# Patient Record
Sex: MALE | Race: BLACK OR AFRICAN AMERICAN
[De-identification: names, ages, dates, MRNs, and addresses within clinical notes are randomized per-mention and may not be internally consistent; named-entity substitution may affect disease eponyms.]

---

## 2017-09-08 ENCOUNTER — HOSPITAL ENCOUNTER (EMERGENCY)
Dept: HOSPITAL 62 - ER | Age: 42
Discharge: HOME | End: 2017-09-08
Payer: COMMERCIAL

## 2017-09-08 VITALS — DIASTOLIC BLOOD PRESSURE: 89 MMHG | SYSTOLIC BLOOD PRESSURE: 133 MMHG

## 2017-09-08 DIAGNOSIS — R51: ICD-10-CM

## 2017-09-08 DIAGNOSIS — I10: Primary | ICD-10-CM

## 2017-09-08 PROCEDURE — 99283 EMERGENCY DEPT VISIT LOW MDM: CPT

## 2017-09-13 NOTE — ER DOCUMENT REPORT
ED General





- General


Chief Complaint: Headache


Stated Complaint: POSSIBLE BLOOD PRESSURE ISSUE


Time Seen by Provider: 09/08/17 12:55


Mode of Arrival: Ambulatory


Information source: Patient


TRAVEL OUTSIDE OF THE [place] IN LAST 30 DAYS: No





- HPI


Patient complains to provider of: BP issue


Onset: This morning - pt. states he checked his BP ealrier today and it was 

"elevated."  Has had mild HA for the past 2 days





- Related Data


Allergies/Adverse Reactions: 


 





No Known Allergies Allergy (Verified 09/08/17 12:46)


 











Past Medical History





- General


Information source: Patient





- Social History


Smoking Status: Never Smoker


Cigarette use (# per day): No


Chew tobacco use (# tins/day): No


Smoking Education Provided: No


Family History: Reviewed & Not Pertinent, Hypertension





- Past Medical History


Cardiac Medical History: Reports: Hx Hypertension


Renal/ Medical History: Denies: Hx Peritoneal Dialysis





- Immunizations


Hx Diphtheria, Pertussis, Tetanus Vaccination: No





Review of Systems





- Review of Systems


Constitutional: No symptoms reported


EENT: No symptoms reported


Cardiovascular: No symptoms reported


Respiratory: No symptoms reported


Gastrointestinal: No symptoms reported


Musculoskeletal: No symptoms reported


-: Yes All other systems reviewed and negative





Physical Exam





- Vital signs


Vitals: 





 











Temp Pulse Resp BP Pulse Ox


 


 98.1 F   77   16   133/89 H  98 


 


 09/08/17 12:46  09/08/17 12:46  09/08/17 12:46  09/08/17 12:46  09/08/17 12:46














- General


General appearance: Appears well


In distress: None





- HEENT


Head: Normocephalic


Eyes: Normal


Pupils: PERRL


Pharynx: Normal


Neck: Normal





- Respiratory


Respiratory status: No respiratory distress


Breath sounds: Normal





- Cardiovascular


Rhythm: Regular


Heart sounds: Normal auscultation





- Extremities


General upper extremity: Normal inspection


General lower extremity: Normal inspection





Course





- Re-evaluation


Re-evalutation: 





09/13/17 10:57


pt felt better at time of d/c -- /89- expressed desire to go home





- Vital Signs


Vital signs: 





 











Temp Pulse Resp BP Pulse Ox


 


 98.1 F   77   16   133/89 H  98 


 


 09/08/17 12:46  09/08/17 12:46  09/08/17 12:46  09/08/17 12:46  09/08/17 12:46














Discharge





- Discharge


Clinical Impression: 


 HTN (hypertension), benign





Condition: Stable


Disposition: HOME, SELF-CARE


Additional Instructions: 


rest, BP recheck in am, return if worse


Referrals: 


BASIL VARGAS DO [NO LOCAL MD] - Follow up as needed

## 2018-06-24 ENCOUNTER — HOSPITAL ENCOUNTER (EMERGENCY)
Dept: HOSPITAL 62 - ER | Age: 43
Discharge: HOME | End: 2018-06-24
Payer: COMMERCIAL

## 2018-06-24 VITALS — SYSTOLIC BLOOD PRESSURE: 131 MMHG | DIASTOLIC BLOOD PRESSURE: 82 MMHG

## 2018-06-24 DIAGNOSIS — R11.2: ICD-10-CM

## 2018-06-24 DIAGNOSIS — R61: ICD-10-CM

## 2018-06-24 DIAGNOSIS — K76.89: ICD-10-CM

## 2018-06-24 DIAGNOSIS — F17.200: ICD-10-CM

## 2018-06-24 DIAGNOSIS — R10.9: ICD-10-CM

## 2018-06-24 DIAGNOSIS — N20.1: ICD-10-CM

## 2018-06-24 DIAGNOSIS — N28.1: Primary | ICD-10-CM

## 2018-06-24 LAB
ADD MANUAL DIFF: NO
ALBUMIN SERPL-MCNC: 4.4 G/DL (ref 3.5–5)
ALP SERPL-CCNC: 62 U/L (ref 38–126)
ALT SERPL-CCNC: 39 U/L (ref 21–72)
ANION GAP SERPL CALC-SCNC: 9 MMOL/L (ref 5–19)
APPEARANCE UR: (no result)
APTT PPP: YELLOW S
AST SERPL-CCNC: 24 U/L (ref 17–59)
BASOPHILS # BLD AUTO: 0 10^3/UL (ref 0–0.2)
BASOPHILS NFR BLD AUTO: 0.4 % (ref 0–2)
BILIRUB DIRECT SERPL-MCNC: 0.4 MG/DL (ref 0–0.4)
BILIRUB SERPL-MCNC: 0.7 MG/DL (ref 0.2–1.3)
BILIRUB UR QL STRIP: NEGATIVE
BUN SERPL-MCNC: 15 MG/DL (ref 7–20)
CALCIUM: 10.3 MG/DL (ref 8.4–10.2)
CHLORIDE SERPL-SCNC: 107 MMOL/L (ref 98–107)
CO2 SERPL-SCNC: 26 MMOL/L (ref 22–30)
EOSINOPHIL # BLD AUTO: 0.1 10^3/UL (ref 0–0.6)
EOSINOPHIL NFR BLD AUTO: 0.6 % (ref 0–6)
ERYTHROCYTE [DISTWIDTH] IN BLOOD BY AUTOMATED COUNT: 13.2 % (ref 11.5–14)
GLUCOSE SERPL-MCNC: 110 MG/DL (ref 75–110)
GLUCOSE UR STRIP-MCNC: NEGATIVE MG/DL
HCT VFR BLD CALC: 48.2 % (ref 37.9–51)
HGB BLD-MCNC: 15.6 G/DL (ref 13.5–17)
KETONES UR STRIP-MCNC: NEGATIVE MG/DL
LIPASE SERPL-CCNC: 45.3 U/L (ref 23–300)
LYMPHOCYTES # BLD AUTO: 1 10^3/UL (ref 0.5–4.7)
LYMPHOCYTES NFR BLD AUTO: 8.7 % (ref 13–45)
MCH RBC QN AUTO: 28.5 PG (ref 27–33.4)
MCHC RBC AUTO-ENTMCNC: 32.4 G/DL (ref 32–36)
MCV RBC AUTO: 88 FL (ref 80–97)
MONOCYTES # BLD AUTO: 0.9 10^3/UL (ref 0.1–1.4)
MONOCYTES NFR BLD AUTO: 7.6 % (ref 3–13)
NEUTROPHILS # BLD AUTO: 9.2 10^3/UL (ref 1.7–8.2)
NEUTS SEG NFR BLD AUTO: 82.7 % (ref 42–78)
NITRITE UR QL STRIP: NEGATIVE
PH UR STRIP: 7 [PH] (ref 5–9)
PLATELET # BLD: 230 10^3/UL (ref 150–450)
POTASSIUM SERPL-SCNC: 4.5 MMOL/L (ref 3.6–5)
PROT SERPL-MCNC: 7.5 G/DL (ref 6.3–8.2)
PROT UR STRIP-MCNC: 100 MG/DL
RBC # BLD AUTO: 5.49 10^6/UL (ref 4.35–5.55)
SODIUM SERPL-SCNC: 142.1 MMOL/L (ref 137–145)
SP GR UR STRIP: 1.02
TOTAL CELLS COUNTED % (AUTO): 100 %
UROBILINOGEN UR-MCNC: 2 MG/DL (ref ?–2)
WBC # BLD AUTO: 11.2 10^3/UL (ref 4–10.5)

## 2018-06-24 PROCEDURE — 99284 EMERGENCY DEPT VISIT MOD MDM: CPT

## 2018-06-24 PROCEDURE — 36415 COLL VENOUS BLD VENIPUNCTURE: CPT

## 2018-06-24 PROCEDURE — 83690 ASSAY OF LIPASE: CPT

## 2018-06-24 PROCEDURE — 76380 CAT SCAN FOLLOW-UP STUDY: CPT

## 2018-06-24 PROCEDURE — 85025 COMPLETE CBC W/AUTO DIFF WBC: CPT

## 2018-06-24 PROCEDURE — 81001 URINALYSIS AUTO W/SCOPE: CPT

## 2018-06-24 PROCEDURE — 80053 COMPREHEN METABOLIC PANEL: CPT

## 2018-06-24 PROCEDURE — 96360 HYDRATION IV INFUSION INIT: CPT

## 2018-06-24 NOTE — RADIOLOGY REPORT (SQ)
EXAM DESCRIPTION:  CT LTD RENAL STONE PROTOCOL ON



COMPLETED DATE/TIME:  6/24/2018 11:33 am



REASON FOR STUDY:  left flank pain



COMPARISON:  None.



TECHNIQUE:  CT scan of the abdomen and pelvis performed without intravenous or oral contrast. Images 
reviewed with lung, soft tissue, and bone windows. Reconstructed coronal and sagittal MPR images revi
ewed. All images stored on PACS.

All CT scanners at this facility use dose modulation, iterative reconstruction, and/or weight based d
osing when appropriate to reduce radiation dose to as low as reasonably achievable (ALARA).

[facility]: Dose Right  [facility]: CareDose    [facility]: Dose Right    [facility]: Teradose 4D    [facility]: Smart [employer]



RADIATION DOSE:  CT Rad equipment meets quality standard of care and radiation dose reduction techniq
ues were employed. CTDIvol: 6.7 mGy. DLP: 394 mGy-cm.mGy.



LIMITATIONS:  None.



FINDINGS:  LOWER CHEST: No significant findings. No nodules or infiltrates.

NON-CONTRASTED LIVER, SPLEEN, ADRENALS: Evaluation limited by lack of IV contrast.  Several tiny, 2- 
3 mm, low-attenuation lesions scattered throughout the liver.  No other identified significant masses
.

PANCREAS: No masses. No peripancreatic inflammatory changes.

GALLBLADDER: No identified stones by CT criteria. No inflammatory changes to suggest cholecystitis.

RIGHT KIDNEY AND URETER: No suspicious masses. Assessment limited by lack of IV contrast.   No signif
icant calcifications.   No hydronephrosis or hydroureter.

LEFT KIDNEY AND URETER: 2.5 cm low-attenuation cortical lesion.  Hounsfield units approximately 5. As
sessment limited by lack of IV contrast.   2 mm calculus at the left ureteral vesicular junction.   M
ild hydronephrosis and hydroureter.

AORTA AND RETROPERITONEUM: No aneurysm. No retroperitoneal masses or adenopathy.

BOWEL AND PERITONEAL CAVITY: No obvious masses or inflammatory changes. No free fluid.

APPENDIX: Normal.

PELVIS, BLADDER, AND ABDOMINAL WALL:No abnormal masses. No free fluid. Bladder normal.

BONES: No significant findings.

OTHER: No other significant finding.



IMPRESSION:

1. 2 MM CALCULUS AT THE LEFT URETERAL VESICULAR JUNCTION WITH MILD HYDRONEPHROSIS AND HYDROURETER.

2. 2 X 5 CM LOW-ATTENUATION CORTICAL LESION IN THE LEFT KIDNEY, MOST LIKELY A CORTICAL CYST.

3. SEVERAL SMALL, 2- 3 MM, LOW-ATTENUATION LESIONS SCATTERED THROUGHOUT THE LIVER.  TOO SMALL TO HAVEN
ACTERIZE BUT MAY BE TINY CYSTS.

4. NO OTHER SIGNIFICANT FINDINGS.



COMMENT:  Quality ID # 436: Final reports with documentation of one or more dose reduction techniques
 (e.g., Automated exposure control, adjustment of the mA and/or kV according to patient size, use of 
iterative reconstruction technique)



TECHNICAL DOCUMENTATION:  JOB ID:  [id number]

 2011 [employer]- All Rights Reserved



Reading location - IP/workstation name: [email or web address]

## 2018-06-24 NOTE — ER DOCUMENT REPORT
ED GI/





- General


Chief Complaint: Abdominal Pain


Stated Complaint: NAUSEA/LEFT FLANK PAIN


Time Seen by Provider: 06/24/18 10:57


Mode of Arrival: Ambulatory


Information source: Patient


Notes: 





Patient states that he woke up at 7:00 this morning with left flank pain that 

wrapped around to the left side of his abdomen.  Patient does report nausea and 

vomiting 1 episode.  Patient states the pain that caused him to break out in a 

sweat.  Patient presently states that pain is resolved after receiving Toradol 

and Zofran per EMS.


TRAVEL OUTSIDE OF THE [place] IN LAST 30 DAYS: No





- HPI


Patient complains to provider of: Flank pain, Vomiting


Onset: This morning


Timing/Duration: Sudden


Quality of pain: Sharp


Severity at maximum: Severe


Pain Level: Denies


Location: LLQ, Left flank


Associated symptoms: Nausea, Vomiting.  denies: Diarrhea, Dysuria, Fever, 

Urinary hesitancy, Urinary frequency, Urinary retention, Urinary urgency


Exacerbated by: Denies


Relieved by: Denies


Similar symptoms previously: No


Recently seen / treated by doctor: No





- Related Data


Allergies/Adverse Reactions: 


 





No Known Allergies Allergy (Verified 09/08/17 12:46)


 











Past Medical History





- General


Information source: Patient





- Social History


Smoking Status: Current Every Day Smoker


Smoking Education Provided: Yes


Frequency of alcohol use: None


Drug Abuse: Marijuana


Occupation: 


Family History: Reviewed & Not Pertinent, Hypertension





- Past Medical History


Cardiac Medical History: Reports: Hx Hypertension


Renal/ Medical History: Denies: Hx Peritoneal Dialysis


Past Surgical History: Reports: Hx Herniorrhaphy





- Immunizations


Hx Diphtheria, Pertussis, Tetanus Vaccination: No





Review of Systems





- Review of Systems


Constitutional: No symptoms reported.  denies: Fever, Recent illness


EENT: No symptoms reported


Cardiovascular: No symptoms reported.  denies: Chest pain


Respiratory: No symptoms reported.  denies: Cough, Short of breath


Gastrointestinal: Abdominal pain, Nausea, Vomiting.  denies: Diarrhea


Genitourinary: Flank pain.  denies: Dysuria


Male Genitourinary: No symptoms reported


Musculoskeletal: Back pain


Skin: No symptoms reported


Hematologic/Lymphatic: No symptoms reported


Neurological/Psychological: No symptoms reported





Physical Exam





- Vital signs


Vitals: 


 











Resp


 


 18 


 


 06/24/18 11:01














- General


General appearance: Appears well, Alert


In distress: None





- HEENT


Head: Normocephalic


Eyes: Normal


Nasal: Normal


Mouth/Lips: Normal


Mucous membranes: Normal


Neck: Normal, Supple.  No: Lymphadenopathy





- Respiratory


Respiratory status: No respiratory distress


Chest status: Nontender


Breath sounds: Normal.  No: Rales, Rhonchi, Stridor, Wheezing


Chest palpation: Normal





- Cardiovascular


Rhythm: Regular


Heart sounds: S1 appreciated, S2 appreciated


Murmur: No





- Abdominal


Inspection: Normal


Distension: No distension


Bowel sounds: Normal


Tenderness: Nontender


Organomegaly: No organomegaly





- Back


Back: Normal, Nontender.  No: CVA tenderness





- Extremities


General upper extremity: Normal inspection, Normal strength


General lower extremity: Normal inspection, Normal strength





- Neurological


Neuro grossly intact: Yes


Cognition: Normal


John Coma Scale Eye Opening: Spontaneous


John Coma Scale Verbal: Oriented


John Coma Scale Motor: Obeys Commands


John Coma Scale Total: 15





- Psychological


Associated symptoms: Normal affect, Normal mood





- Skin


Skin Temperature: Warm


Skin Moisture: Dry


Skin Color: Normal





Course





- Re-evaluation


Re-evalutation: 





06/24/18 12:02


Patient denies any pain symptoms at this time and denies any nausea.  Reviewed 

results of patient's CT scan report with him.  Patient advised of lesions noted 

on the left kidney as well as liver and the need for outpatient follow-up of 

these findings.


06/24/18 13:00


Consulted with Dr. Aragon regarding patient presentation, reviewed 

diagnostic test results.  Agrees with discharge plan of care at this time and 

outpatient follow-up with urology.








- Vital Signs


Vital signs: 


 











Temp Pulse Resp BP Pulse Ox


 


 98.0 F   59 L  16   131/82 H  100 


 


 06/24/18 13:34  06/24/18 13:34  06/24/18 13:34  06/24/18 13:34  06/24/18 13:34














- Laboratory


Result Diagrams: 


 06/24/18 10:55





 06/24/18 10:55


Laboratory results interpreted by me: 


 











  06/24/18 06/24/18 06/24/18





  10:55 10:55 12:00


 


WBC  11.2 H  


 


Seg Neutrophils %  82.7 H  


 


Lymphocytes %  8.7 L  


 


Absolute Neutrophils  9.2 H  


 


Creatinine   1.35 H 


 


Est GFR (Non-Af Amer)   58 L 


 


Calcium   10.3 H 


 


Urine Protein    100 H


 


Urine Blood    LARGE H


 


Urine Urobilinogen    2.0 H














 Labs- Entire Visit











  06/24/18 06/24/18 06/24/18





  10:55 10:55 12:00


 


WBC  11.2 H  


 


RBC  5.49  


 


Hgb  15.6  


 


Hct  48.2  


 


MCV  88  


 


MCH  28.5  


 


MCHC  32.4  


 


RDW  13.2  


 


Plt Count  230  


 


Seg Neutrophils %  82.7 H  


 


Lymphocytes %  8.7 L  


 


Monocytes %  7.6  


 


Eosinophils %  0.6  


 


Basophils %  0.4  


 


Absolute Neutrophils  9.2 H  


 


Absolute Lymphocytes  1.0  


 


Absolute Monocytes  0.9  


 


Absolute Eosinophils  0.1  


 


Absolute Basophils  0.0  


 


Sodium   142.1 


 


Potassium   4.5 


 


Chloride   107 


 


Carbon Dioxide   26 


 


Anion Gap   9 


 


BUN   15 


 


Creatinine   1.35 H 


 


Est GFR ( Amer)   > 60 


 


Est GFR (Non-Af Amer)   58 L 


 


Glucose   110 


 


Calcium   10.3 H 


 


Total Bilirubin   0.7 


 


Direct Bilirubin   0.4 


 


Neonat Total Bilirubin   Not Reportable 


 


Neonat Direct Bilirubin   Not Reportable 


 


Neonat Indirect Bili   Not Reportable 


 


AST   24 


 


ALT   39 


 


Alkaline Phosphatase   62 


 


Total Protein   7.5 


 


Albumin   4.4 


 


Lipase   45.3 


 


Urine Color    YELLOW


 


Urine Appearance    SLIGHTLY-CLOUDY


 


Urine pH    7.0


 


Ur Specific Gravity    1.016


 


Urine Protein    100 H


 


Urine Glucose (UA)    NEGATIVE


 


Urine Ketones    NEGATIVE


 


Urine Blood    LARGE H


 


Urine Nitrite    NEGATIVE


 


Urine Bilirubin    NEGATIVE


 


Urine Urobilinogen    2.0 H


 


Ur Leukocyte Esterase    NEGATIVE


 


Urine WBC (Auto)    2


 


Urine RBC (Auto)    >182


 


Squamous Epi Cells Auto    1


 


Urine Mucus (Auto)    MOD


 


Urine Ascorbic Acid    NEGATIVE














- Diagnostic Test


Radiology reviewed: Reports reviewed





Discharge





- Discharge


Clinical Impression: 


 Renal cyst, Liver cyst, Ureteral stone





Condition: Stable


Disposition: HOME, SELF-CARE


Additional Instructions: 


Return immediately for any new or worsening symptoms





Followup with your primary care provider, call tomorrow to make a followup 

appointment





Your CAT scan demonstrated that you have a cyst on your left kidney as well as 

multiple small cysts of the liver area.  He can follow-up with a GI specialist 

as well as urologist for further evaluation.





KIDNEY STONE:





     You are passing or have passed a kidney stone.  These stones are usually 

due to increased calcium or uric acid concentrations in your urine.  Stones 

within the kidney itself are not painful.  The pain occurs as the stone leaves 

the kidney to pass down the long tube, called the ureter, leading to the 

bladder.


     If the stone is small, it will usually pass by itself.  Most patients can 

pass the stone at home.  You will usually receive medications for pain, nausea 

or vomiting, and sometimes a medication to assist in passing the kidney stone.  

However, if the pain is very severe or if vomiting prevents you from taking 

oral pain medications, you may need to return for further treatment.


     Drink three or four quarts of fluids per day.  You will be given pain 

medication (if needed) and urine strainers.  Strain all your urine to see if 

the stone passes.  If your doctor has asked you to bring the stone in for 

analysis, return with the stone once it has passed.


     Return if pain or vomiting become severe, if you develop a high fever, if 

you are unable to pass your urine, or if other unusual symptoms occur.








TORADOL INJECTION:


     You have been given an injection of ketorolac tromethamine (Toradol).  

This is an excellent, safe drug for pain control.  It also has potent 

antiinflammatory action.  You should have significant pain relief within about 

one hour.


     Toradol is not addicting and is non-sedating.  It does not interfere with 

driving or work.


     Call or return if you develop itching, hives, shortness of breath, or rash.








[place] MEDICATION:


     You have been given a medication to suppress nausea and vomiting. This 

type of medication can be given as a shot, pill, or suppository. It will 

usually last for many hours.  Pills and shots usually last six to eight hours, 

suppositories last about 12 hours.  For the typical illness, only one or two 

doses of the medication may be necessary.


     Mild lightheadedness may occur.  This type of medicine can cause 

drowsiness.  Do not drive or operate dangerous machinery while under its 

influence.  Do not mix with alcohol.


     See your doctor at once if you have muscle spasms or tightness, or 

uncontrollable motions (particularly of the neck, mouth, or jaw). Persistent 

vomiting or severe lightheadedness should also be evaluated by the physician.








ORAL NARCOTIC MEDICATION:


     You have been given a prescription for pain control.  This medication is a 

narcotic.  It's best taken with food, as nausea can result if taken on an empty 

stomach.


     Don't operate machinery or drive within six hours of taking this 

medication.  Do not combine this medicine with alcohol, or with any medication 

which can cause sedation (such as cold tablets or sleeping pills) unless you 

get permission from the physician.


     Narcotics tend to cause constipation.  If possible, drink plenty of fluids 

and eat a diet high in fiber and fruits.





     Please be aware that prescription narcotics also have the potential for 

abuse.  People become addicted to these medications because of the general 

sense of wellbeing that they induce.  This feeling along with a significant 

reduction in tension, anxiety, and aggression provides a stimulating seductive 

quality to these drugs.  Once your pain is under control, we encourage you to 

discard your unused narcotics.





FOLLOW-UP CARE:


If you have been referred to a physician for follow-up care, call the physician

s office for an appointment as you were instructed or within the next two days.

  If you experience worsening or a significant change in your symptoms, notify 

the physician immediately or return to the Emergency Department at any time for 

re-evaluation.


Prescriptions: 


Ondansetron HCl [Zofran 4 mg Tablet] 1 - 2 tab PO Q6 PRN #15 tablet


 PRN Reason: 


Oxycodone HCl/Acetaminophen [Percocet 5-325 mg Tablet] 1 tab PO ASDIR PRN #15 

tablet


 PRN Reason: 


Forms:  Smoking Cessation Education, Return to Work


Referrals: 


[facility] [Provider Group] - Follow up as needed


[facility] [Provider Group] - Follow up tomorrow

## 2019-08-12 ENCOUNTER — HOSPITAL ENCOUNTER (EMERGENCY)
Dept: HOSPITAL 62 - ER | Age: 44
Discharge: HOME | End: 2019-08-12
Payer: COMMERCIAL

## 2019-08-12 VITALS — SYSTOLIC BLOOD PRESSURE: 121 MMHG | DIASTOLIC BLOOD PRESSURE: 84 MMHG

## 2019-08-12 DIAGNOSIS — I10: ICD-10-CM

## 2019-08-12 DIAGNOSIS — F17.200: ICD-10-CM

## 2019-08-12 DIAGNOSIS — R06.02: Primary | ICD-10-CM

## 2019-08-12 PROCEDURE — 99283 EMERGENCY DEPT VISIT LOW MDM: CPT

## 2019-08-12 NOTE — ER DOCUMENT REPORT
HPI





- HPI


Time Seen by Provider: 08/12/19 11:19


Pain Level: 2


Notes: 





Patient presents to the emergency department requesting a work note to return to

work tomorrow.  Patient reports he has missed the last 2 days of work as he had 

shortness of breath 2 days ago.  He states his symptoms have resolved.  He 

states that he has hypertension, he states his blood pressure was elevated 2 

days ago so he called this morning and got an appointment with his primary care 

provider.  Currently he denies any chest pain, shortness of breath or any other 

acute complaint.  He states he cannot go back to work without a work note.








Past Medical History





- General


Information source: Patient





- Social History


Smoking Status: Current Every Day Smoker


Frequency of alcohol use: None


Drug Abuse: None


Family History: Reviewed & Not Pertinent, Hypertension





- Past Medical History


Cardiac Medical History: Reports: Hx Hypertension


Renal/ Medical History: Denies: Hx Peritoneal Dialysis


Past Surgical History: Reports: Hx Herniorrhaphy





- Immunizations


Hx Diphtheria, Pertussis, Tetanus Vaccination: No





Vertical Provider Document





- CONSTITUTIONAL


Notes: 





PHYSICAL EXAMINATION:





GENERAL: Well-appearing, well-nourished and in no acute distress.





HEAD: Atraumatic, normocephalic.





EYES: Pupils equal round extraocular movements intact,  conjunctiva are normal.





ENT: Nares patent





NECK: Normal range of motion





LUNGS: No respiratory distress, lung sounds clear and equal bilaterally.





Musculoskeletal: Normal range of motion





NEUROLOGICAL:  Normal speech, normal gait. 





PSYCH: Normal mood, normal affect.





SKIN: Warm, Dry, normal turgor, no rashes or lesions noted.





- INFECTION CONTROL


TRAVEL OUTSIDE OF THE [place] IN LAST 30 DAYS: No





Course





- Re-evaluation


Re-evalutation: 





08/12/19 11:26


Patient appears well, nontoxic, vital signs within normal limits.  Patient 

denies any chest pain or shortness of breath.  He reports that he just needs a 

work note so that he can go back to work tomorrow.  Patient will be discharged 

home in stable condition.








- Vital Signs


Vital signs: 


                                        











Temp Pulse Resp BP Pulse Ox


 


 98.0 F   70   18   121/84   97 


 


 08/12/19 10:56  08/12/19 10:56  08/12/19 10:56  08/12/19 10:56  08/12/19 10:56














Discharge





- Discharge


Clinical Impression: 


 Encounter for work note





Condition: Stable


Disposition: HOME, SELF-CARE


Additional Instructions: 


Please keep the appointment you have scheduled with your primary care provider 

to establish treatment for your hypertension.  Your blood pressure was normal 

today here in the emergency department.  You may return to work tomorrow.  A 

work note has been provided.





Please return to the emergency department with any new or worsening symptoms to 

include difficulty breathing, shortness of breath or chest pain.





Forms:  Return to Work